# Patient Record
Sex: MALE | Race: WHITE | Employment: FULL TIME | URBAN - METROPOLITAN AREA
[De-identification: names, ages, dates, MRNs, and addresses within clinical notes are randomized per-mention and may not be internally consistent; named-entity substitution may affect disease eponyms.]

---

## 2017-02-01 ENCOUNTER — OFFICE VISIT (OUTPATIENT)
Dept: CARDIOLOGY CLINIC | Age: 67
End: 2017-02-01

## 2017-02-01 VITALS
HEIGHT: 71 IN | HEART RATE: 60 BPM | SYSTOLIC BLOOD PRESSURE: 126 MMHG | BODY MASS INDEX: 25.76 KG/M2 | WEIGHT: 184 LBS | DIASTOLIC BLOOD PRESSURE: 66 MMHG

## 2017-02-01 DIAGNOSIS — I25.10 CORONARY ARTERY DISEASE INVOLVING NATIVE CORONARY ARTERY OF NATIVE HEART WITHOUT ANGINA PECTORIS: ICD-10-CM

## 2017-02-01 DIAGNOSIS — I47.20 VT (VENTRICULAR TACHYCARDIA): ICD-10-CM

## 2017-02-01 DIAGNOSIS — I48.0 PAROXYSMAL ATRIAL FIBRILLATION (HCC): Primary | ICD-10-CM

## 2017-02-01 PROCEDURE — 4040F PNEUMOC VAC/ADMIN/RCVD: CPT | Performed by: INTERNAL MEDICINE

## 2017-02-01 PROCEDURE — G8427 DOCREV CUR MEDS BY ELIG CLIN: HCPCS | Performed by: INTERNAL MEDICINE

## 2017-02-01 PROCEDURE — G8484 FLU IMMUNIZE NO ADMIN: HCPCS | Performed by: INTERNAL MEDICINE

## 2017-02-01 PROCEDURE — G8420 CALC BMI NORM PARAMETERS: HCPCS | Performed by: INTERNAL MEDICINE

## 2017-02-01 PROCEDURE — 1123F ACP DISCUSS/DSCN MKR DOCD: CPT | Performed by: INTERNAL MEDICINE

## 2017-02-01 PROCEDURE — 1036F TOBACCO NON-USER: CPT | Performed by: INTERNAL MEDICINE

## 2017-02-01 PROCEDURE — 99214 OFFICE O/P EST MOD 30 MIN: CPT | Performed by: INTERNAL MEDICINE

## 2017-02-01 PROCEDURE — G8599 NO ASA/ANTIPLAT THER USE RNG: HCPCS | Performed by: INTERNAL MEDICINE

## 2017-02-01 PROCEDURE — 3017F COLORECTAL CA SCREEN DOC REV: CPT | Performed by: INTERNAL MEDICINE

## 2017-11-06 ENCOUNTER — TELEPHONE (OUTPATIENT)
Dept: CARDIOLOGY CLINIC | Age: 67
End: 2017-11-06

## 2017-11-06 NOTE — TELEPHONE ENCOUNTER
Patient had a DOT physical Urgent Care, [facility], [address] phone [phone], fax [phone]. Patient needs a letter stating that he is allowed to drive with afib. Please fax to Urgent Care. Please call patient and advise. Thanks, Ginny

## 2017-11-08 ENCOUNTER — TELEPHONE (OUTPATIENT)
Dept: CARDIOLOGY CLINIC | Age: 67
End: 2017-11-08

## 2017-11-08 NOTE — TELEPHONE ENCOUNTER
Spoke with Dr. Cuco Mcdermott, DOT doctor can have a copy of office letter to make determination regarding Afib.

## 2017-11-08 NOTE — TELEPHONE ENCOUNTER
Calling to see if note was ever faxed to urgent care for him so he could pass his DOT physical (see 11/6/17 message).  Please call pt when done

## 2018-02-19 ENCOUNTER — OFFICE VISIT (OUTPATIENT)
Dept: CARDIOLOGY CLINIC | Age: 68
End: 2018-02-19

## 2018-02-19 VITALS
DIASTOLIC BLOOD PRESSURE: 74 MMHG | HEART RATE: 68 BPM | SYSTOLIC BLOOD PRESSURE: 124 MMHG | BODY MASS INDEX: 24.78 KG/M2 | WEIGHT: 177 LBS | HEIGHT: 71 IN

## 2018-02-19 DIAGNOSIS — I48.0 PAROXYSMAL ATRIAL FIBRILLATION (HCC): ICD-10-CM

## 2018-02-19 DIAGNOSIS — I25.10 CORONARY ARTERY DISEASE INVOLVING NATIVE CORONARY ARTERY OF NATIVE HEART WITHOUT ANGINA PECTORIS: Primary | ICD-10-CM

## 2018-02-19 DIAGNOSIS — I47.20 VT (VENTRICULAR TACHYCARDIA): ICD-10-CM

## 2018-02-19 PROCEDURE — 99214 OFFICE O/P EST MOD 30 MIN: CPT | Performed by: INTERNAL MEDICINE

## 2018-02-19 PROCEDURE — G8420 CALC BMI NORM PARAMETERS: HCPCS | Performed by: INTERNAL MEDICINE

## 2018-02-19 PROCEDURE — 1123F ACP DISCUSS/DSCN MKR DOCD: CPT | Performed by: INTERNAL MEDICINE

## 2018-02-19 PROCEDURE — G8598 ASA/ANTIPLAT THER USED: HCPCS | Performed by: INTERNAL MEDICINE

## 2018-02-19 PROCEDURE — 4040F PNEUMOC VAC/ADMIN/RCVD: CPT | Performed by: INTERNAL MEDICINE

## 2018-02-19 PROCEDURE — 1036F TOBACCO NON-USER: CPT | Performed by: INTERNAL MEDICINE

## 2018-02-19 PROCEDURE — G8427 DOCREV CUR MEDS BY ELIG CLIN: HCPCS | Performed by: INTERNAL MEDICINE

## 2018-02-19 PROCEDURE — G8484 FLU IMMUNIZE NO ADMIN: HCPCS | Performed by: INTERNAL MEDICINE

## 2018-02-19 PROCEDURE — 3017F COLORECTAL CA SCREEN DOC REV: CPT | Performed by: INTERNAL MEDICINE

## 2018-02-19 RX ORDER — SIMVASTATIN 10 MG
10 TABLET ORAL NIGHTLY
Qty: 30 TABLET | Refills: 3
Start: 2018-02-19

## 2018-02-19 RX ORDER — ASPIRIN 325 MG
325 TABLET, DELAYED RELEASE (ENTERIC COATED) ORAL DAILY
Qty: 30 TABLET | Refills: 3
Start: 2018-02-19 | End: 2019-05-14 | Stop reason: DRUGHIGH

## 2018-02-19 NOTE — PROGRESS NOTES
[address]       H+P // CONSULT // OUTPATIENT VISIT // Martha Umanzor VISIT     Referring Doctor Guanaco Pereira MD   Encounter Type Followup     CHIEF COMPLAINT     VisitType [] Acute [x] Chronic     Symptom [x] None [] CP [] SOB [] Dizzy [] Palps [] Fatigue     Problems CAD, AFIB, VT      HISTORY OF PRESENT ILLNESS     Doing well. Denies cp, sob. Compliant with meds. Exercising without difficulty. No palpitations. Symptom Y N Frequency Duration Severity Modifying Assoc Sx Other   CP [] [x]         SOB [] [x]         Dizzy [] [x]         Syncope [] [x]         Palpitations [] [x]           COMPLIANCE     Category Meds Diet Salt Exercise Tobacco Alcohol Drugs   Compliant [x] [x] [x] [x] [x] [x] [x]   [x]Counseling given on all above above categories    HISTORY/ALLERGIES/ROS     MedHx:  has a past medical history of CAD (coronary artery disease); Colon polyps; Hypertension; Nonsustained ventricular tachycardia ([address] Paroxysmal atrial fibrillation ([address] and Rectal abscess. SurgHx:  has a past surgical history that includes Ankle surgery (2008); hernia repair (2002); shoulder surgery (2/8/12); and Shoulder arthroscopy (Left, 9/13). SocHx:   reports that he quit smoking about 31 years ago. His smoking use included Cigarettes. He has never used smokeless tobacco. He reports that he drinks about 3.0 oz of alcohol per week . He reports that he does not use drugs. FamHx: family history includes Colon Cancer (age of onset: 76) in his father; Diabetes in his father; High Blood Pressure in his father; Hypertension in his father. Allergies: Latex   ROS:  [x]Full ROS obtained and negative except as mentioned in HPI     MEDICATIONS      Current Outpatient Prescriptions   Medication Sig Dispense Refill    hydrochlorothiazide (HYDRODIURIL) 25 MG tablet TAKE 1 TABLET DAILY 30 tablet 0    escitalopram (LEXAPRO) 10 MG tablet Take 1 po qd.  (Patient taking differently: 20 mg Take 1 po qd.) 90 tablet 1    Cholecalciferol (VITAMIN D) 2000 UNITS CAPS capsule Take  by mouth.  aspirin  MG EC tablet Take 1 tablet by mouth every 6 hours as needed for Pain. 30 tablet 3    multivitamin (THERAGRAN) per tablet Take 1 tablet by mouth daily.  cetirizine (ZYRTEC) 10 MG tablet Take 10 mg by mouth nightly.  Melatonin 2.5-338 MG-MCG SUBL Place 1 mg under the tongue nightly.  fish oil-omega-3 fatty acids 1000 MG capsule Take 2 g by mouth daily.  Glucosamine Sulfate 750 MG TABS Take 1 tablet by mouth daily.  B Complex Vitamins (VITAMIN B COMPLEX) TABS Take  by mouth daily. No current facility-administered medications for this visit.       Reviewed with patient and will remain unchanged except as mentioned in A/P  PHYSICAL EXAM     Vitals:    02/19/18 0800   BP: 124/74   Pulse: 68      Gen Alert, coop, no distress Heart  RRR, no MRG, nl apic impulse   Head NC, AT, no abnorm Abd  Soft, NT, +BS, no mass, no OM   Eyes PERRLA, conj/corn clear Ext  Ext nl, AT, no C/C/E   Nose Nares nl, no drain, NT Pulse 2+ and symmetric   Throat Lips, mucosa, tongue nl Skin Color/text/turg nl, no rash/lesions   Neck S/S, TM, NT, no bruit/JVD Psych Nl mood and affect   Lung CTA-B, unlabored, no DTP Lymph   No cervical or axillary LA   Ch wall NT, no deform Neuro  Nl gross M/S exam     ASSESSMENT AND PLAN     ~CAD   Date EF Results   Sx   No concerning   [place]   [x]I         []II           []III          []IV   Hx   No interventions   LHC 5/15 55% Nonobstructive CAD (RCA 20%)   MPI 4/15 69% Small inferior fixed   TTE 4/13  4/15 55%  55%    Plan   Continue aggressive medical treatment at doses above   ~Afib   Date Detail   Type  Low to no burden   R/R  Regular, controlled   R/RM  Reviewed   CVS  1   AC/AP  ASA    Plan  Continue current medical regimen at doses noted above   ~VT, nonsustained  No known recurrence  Continued observation with normal EF  ~Followup  []2 wk   []1m   []3m    []6m   [x]12m    []PRN

## 2019-05-01 NOTE — PROGRESS NOTES
[address]     H+P // CONSULT // OUTPATIENT VISIT // Martinez Velasquez VISIT     Referring Doctor Enrique Fregoso MD   Encounter Type Followup     CHIEF COMPLAINT     Visit Type Chronic   Symptoms None   Problems CAD, pAFIB, VT     HISTORY OF PRESENT ILLNESS      Doing well. No new concerns. Active without difficulty or sx.  Denies cp, sob, dizziness, syncope, palpitations.  Compliant with CV meds listed below without notable side effects.  Ambulatory BP readings in good range   Last cholesterol profile not available from PCP but states in good range. COMPLIANCE     Category Meds Diet Salt Exercise Tobacco Alcohol Drugs   Compliant [x] [x] [x] [x] [x] [x] [x]   [x]Counseling given on all above above categories    HISTORY/ALLERGIES/ROS     MedHx:  has a past medical history of CAD (coronary artery disease), Colon polyps, Hypertension, Nonsustained ventricular tachycardia ([address] Paroxysmal atrial fibrillation ([address] and Rectal abscess. SurgHx:  has a past surgical history that includes Ankle surgery (2008); hernia repair (2002); shoulder surgery (2/8/12); and Shoulder arthroscopy (Left, 9/13). SocHx:   reports that he quit smoking about 32 years ago. His smoking use included cigarettes. He has never used smokeless tobacco. He reports that he drinks about 3.0 oz of alcohol per week. He reports that he does not use drugs. FamHx: family history includes Colon Cancer (age of onset: 76) in his father; Diabetes in his father; High Blood Pressure in his father; Hypertension in his father.    Allergies: Latex   ROS:  [x]Full ROS obtained and negative except as mentioned in HPI     MEDICATIONS     AP [address]   AC    AH    CHOL zocor 10   OTHER hctz 25     PHYSICAL EXAM     Vitals:    05/14/19 0816   BP: 110/60   Pulse: 60      Gen Alert, coop, no distress Heart  RRR, no MRG, nl apical impulse   Head NC, AT, no abnorm Abd  Soft, NT, +BS, no mass, no OM   Eyes PER, conj/corn clear Ext  Ext nl, AT, no C/C/E   Nose Nares nl, no drain, NT Pulse 2+ and symmetric   Throat Lips, mucosa, tongue nl Skin Col/text/turg nl, no vis rash/les   Neck S/S, TM, NT, no bruit/JVD Psych Nl mood and affect   Lung CTA-B, unlabored, no DTP Lymph   No cervical or axillary LA   Ch wall NT, no deform Neuro  Nl gross M/S exam     ASSESSMENT AND PLAN     ~CAD   Date EF Results   Sx   No concerning   Hx   No interventions   LHC 5/15 55% Nonobstructive CAD (RCA 20%)   MPI 4/15 69% Small inferior fixed, diaphragm   TTE 4/13  4/15 55%  55%    Plan   Continue aggressive medical treatment at doses above   ~Afib   Date Detail   Type  Paroxysmal, low to no burden   R/R  Regular, controlled   R/RM  Reviewed   CVS  1   AC/AP  ASA    Plan  Continue current medical regimen at doses noted above  EKG in office today with NSR.    ~VT, nonsustained  No known recurrence  Plan Continued observation with normal EF  ~Followup  Interval: 12 months  Tests/Labs: None    Scribe Attestation  Bobo WILCOX, am scribing for and in the presence of Garima Jin MD.   SignedBobo 05/01/19 2:50 PM   Provider Kenan Banks is working as a scribe for and in the presence of me (Garima Jin MD). Working as a scribe, Bobo Hanks may have prepopulated components of this note with my historical  intellectual property under my direct supervision. Any additions to this intellectual property were performed in my presence and at my direction.   Furthermore, the content and accuracy of this note have been reviewed by me Garima Jin MD).  5/14/2019 7:27 AM

## 2019-05-14 ENCOUNTER — OFFICE VISIT (OUTPATIENT)
Dept: CARDIOLOGY CLINIC | Age: 69
End: 2019-05-14
Payer: MEDICARE

## 2019-05-14 VITALS
BODY MASS INDEX: 25.88 KG/M2 | HEIGHT: 70 IN | DIASTOLIC BLOOD PRESSURE: 60 MMHG | HEART RATE: 60 BPM | SYSTOLIC BLOOD PRESSURE: 110 MMHG | WEIGHT: 180.8 LBS

## 2019-05-14 DIAGNOSIS — I48.0 PAF (PAROXYSMAL ATRIAL FIBRILLATION) (HCC): ICD-10-CM

## 2019-05-14 DIAGNOSIS — I48.0 PAROXYSMAL ATRIAL FIBRILLATION (HCC): ICD-10-CM

## 2019-05-14 DIAGNOSIS — I47.20 VT (VENTRICULAR TACHYCARDIA): ICD-10-CM

## 2019-05-14 DIAGNOSIS — I25.10 CORONARY ARTERY DISEASE INVOLVING NATIVE CORONARY ARTERY OF NATIVE HEART WITHOUT ANGINA PECTORIS: Primary | ICD-10-CM

## 2019-05-14 PROCEDURE — 3017F COLORECTAL CA SCREEN DOC REV: CPT | Performed by: INTERNAL MEDICINE

## 2019-05-14 PROCEDURE — G8427 DOCREV CUR MEDS BY ELIG CLIN: HCPCS | Performed by: INTERNAL MEDICINE

## 2019-05-14 PROCEDURE — 93000 ELECTROCARDIOGRAM COMPLETE: CPT | Performed by: INTERNAL MEDICINE

## 2019-05-14 PROCEDURE — 1123F ACP DISCUSS/DSCN MKR DOCD: CPT | Performed by: INTERNAL MEDICINE

## 2019-05-14 PROCEDURE — 4040F PNEUMOC VAC/ADMIN/RCVD: CPT | Performed by: INTERNAL MEDICINE

## 2019-05-14 PROCEDURE — 1036F TOBACCO NON-USER: CPT | Performed by: INTERNAL MEDICINE

## 2019-05-14 PROCEDURE — G8419 CALC BMI OUT NRM PARAM NOF/U: HCPCS | Performed by: INTERNAL MEDICINE

## 2019-05-14 PROCEDURE — 99214 OFFICE O/P EST MOD 30 MIN: CPT | Performed by: INTERNAL MEDICINE

## 2019-05-14 PROCEDURE — G8598 ASA/ANTIPLAT THER USED: HCPCS | Performed by: INTERNAL MEDICINE

## 2019-05-14 RX ORDER — ASPIRIN 81 MG/1
81 TABLET ORAL DAILY
Qty: 90 TABLET | Refills: 1 | COMMUNITY
Start: 2019-05-14

## 2019-05-14 NOTE — LETTER
[address] [address] [address] [address]  Phone: [phone]  Fax: [phone]    Kodi Negron MD        May 14, 2019     Dayanara Medina MD  [facility]  [address] [id number]    Patient: Maddison Oropeza  MR Number: [id number]  YOB: 1950  Date of Visit: 5/14/2019    Dear Dr. Dayanara Medina: Thank you for the request for consultation for Avril Hastings to me for the evaluation of ***. Below are the relevant portions of my assessment and plan of care. If you have questions, please do not hesitate to call me. I look forward to following Gretchen Mann along with you.     Sincerely,        Kodi Negron MD

## 2020-05-21 ENCOUNTER — OFFICE VISIT (OUTPATIENT)
Dept: CARDIOLOGY CLINIC | Age: 70
End: 2020-05-21
Payer: OTHER GOVERNMENT

## 2020-05-21 VITALS
HEIGHT: 70 IN | WEIGHT: 176.9 LBS | DIASTOLIC BLOOD PRESSURE: 70 MMHG | HEART RATE: 58 BPM | OXYGEN SATURATION: 97 % | BODY MASS INDEX: 25.33 KG/M2 | SYSTOLIC BLOOD PRESSURE: 126 MMHG

## 2020-05-21 PROCEDURE — 1123F ACP DISCUSS/DSCN MKR DOCD: CPT | Performed by: INTERNAL MEDICINE

## 2020-05-21 PROCEDURE — G8427 DOCREV CUR MEDS BY ELIG CLIN: HCPCS | Performed by: INTERNAL MEDICINE

## 2020-05-21 PROCEDURE — 3017F COLORECTAL CA SCREEN DOC REV: CPT | Performed by: INTERNAL MEDICINE

## 2020-05-21 PROCEDURE — 4040F PNEUMOC VAC/ADMIN/RCVD: CPT | Performed by: INTERNAL MEDICINE

## 2020-05-21 PROCEDURE — 1036F TOBACCO NON-USER: CPT | Performed by: INTERNAL MEDICINE

## 2020-05-21 PROCEDURE — G8417 CALC BMI ABV UP PARAM F/U: HCPCS | Performed by: INTERNAL MEDICINE

## 2020-05-21 PROCEDURE — 99214 OFFICE O/P EST MOD 30 MIN: CPT | Performed by: INTERNAL MEDICINE

## 2020-05-21 RX ORDER — AMLODIPINE BESYLATE 10 MG/1
10 TABLET ORAL DAILY
COMMUNITY

## 2020-07-13 ENCOUNTER — TELEPHONE (OUTPATIENT)
Dept: CARDIOLOGY CLINIC | Age: 70
End: 2020-07-13

## 2020-07-13 NOTE — TELEPHONE ENCOUNTER
Pt calling wanting to let DCE know he went to [facility] ER yesterday thought he was having a heart attack was having chest pains. Turns out he pulled a muscle in his chest. Would like DCE to review those records.  Pls call to advise Thank you

## 2020-07-13 NOTE — TELEPHONE ENCOUNTER
Last ov 5/21/20 DCE     has a past medical history of CAD (coronary artery disease), Colon polyps, Hypertension, Nonsustained ventricular tachycardia ([address] Paroxysmal atrial fibrillation [address] [address]

## 2020-07-13 NOTE — TELEPHONE ENCOUNTER
JOSEPH reviewed patient's records from [address] [place] [address] and he said the patient could come in for an appt if he was still have issues. I spoke with the patient and he said he agrees that it is a pulled muscle in the right side of the chest. He said he would call in and make an appt if he starts having other symptoms.

## 2021-05-12 NOTE — PROGRESS NOTES
escitalopram (LEXAPRO) 10 MG tablet Take 1 po qd. (Patient taking differently: 20 mg Take 1 po qd.) 90 tablet 1    Cholecalciferol (VITAMIN D) 2000 UNITS CAPS capsule Take  by mouth.  multivitamin (THERAGRAN) per tablet Take 1 tablet by mouth daily.  Melatonin 2.5-338 MG-MCG SUBL Place 1 mg under the tongue nightly.  fish oil-omega-3 fatty acids 1000 MG capsule Take 2 g by mouth daily.  Glucosamine Sulfate 750 MG TABS Take 1 tablet by mouth daily.  B Complex Vitamins (VITAMIN B COMPLEX) TABS Take  by mouth daily. No current facility-administered medications for this visit.       Reviewed with patient and will remain unchanged except as mentioned in A/P  PHYSICAL EXAM     Vitals:    05/14/21 0807   BP: 120/74   Pulse: 58   SpO2: 99%      Gen Alert, coop, no distress Heart  Rrr, no mrg   Head NC, AT, no abnorm Abd  Soft, NT, +BS, no mass, no OM   Eyes PER, conj/corn clear Ext  Ext nl, AT, no C/C/E   Nose Nares nl, no drain, NT Pulse 2+ and symmetric   Throat Lips, mucosa, tongue nl Skin Col/text/turg nl, no vis rash/les   Neck S/S, TM, NT, no bruit/JVD Psych Nl mood and affect   Lung CTA-B, unlabored, no DTP Lymph   No cervical or axillary LA   Ch wall NT, no deform Neuro  Nl gross M/S exam     ASSESSMENT AND PLAN     *CAD   Date EF Results   Sx   No concerning   Hx   No interventions   LHC 5/15 55% Nonobstructive CAD (RCA 20%)   MPI 4/15 69% Small inferior fixed, diaphragm   TTE 4/13  4/15 55%  55%    Plan   Continue aggressive medical treatment at doses above   *AFIB  Hx parox  Status ASA, sinus today  Plan No recent recurrence  *VT, NONSUSTAINED  No known recurrence  Plan Continued observation with normal EF  *COMPLIANCE  Status Compliant  Plan Discussed importance of compliance with meds/diet/salt/exercise; avoid tob/alc/drugs; patient verbalized understanding  *FOLLOWUP  12 months    [address] CARUSO, Armen Preston, am scribing for and in the presence of Vinod Ortiz,

## 2021-05-14 ENCOUNTER — OFFICE VISIT (OUTPATIENT)
Dept: CARDIOLOGY CLINIC | Age: 71
End: 2021-05-14
Payer: MEDICARE

## 2021-05-14 VITALS
BODY MASS INDEX: 26.92 KG/M2 | WEIGHT: 188 LBS | SYSTOLIC BLOOD PRESSURE: 120 MMHG | HEIGHT: 70 IN | HEART RATE: 58 BPM | OXYGEN SATURATION: 99 % | DIASTOLIC BLOOD PRESSURE: 74 MMHG

## 2021-05-14 DIAGNOSIS — I25.10 CORONARY ARTERY DISEASE DUE TO LIPID RICH PLAQUE: Primary | ICD-10-CM

## 2021-05-14 DIAGNOSIS — I25.83 CORONARY ARTERY DISEASE DUE TO LIPID RICH PLAQUE: Primary | ICD-10-CM

## 2021-05-14 DIAGNOSIS — I47.20 VT (VENTRICULAR TACHYCARDIA): ICD-10-CM

## 2021-05-14 DIAGNOSIS — I48.0 PAF (PAROXYSMAL ATRIAL FIBRILLATION) (HCC): ICD-10-CM

## 2021-05-14 PROCEDURE — G8427 DOCREV CUR MEDS BY ELIG CLIN: HCPCS | Performed by: INTERNAL MEDICINE

## 2021-05-14 PROCEDURE — G8417 CALC BMI ABV UP PARAM F/U: HCPCS | Performed by: INTERNAL MEDICINE

## 2021-05-14 PROCEDURE — 4040F PNEUMOC VAC/ADMIN/RCVD: CPT | Performed by: INTERNAL MEDICINE

## 2021-05-14 PROCEDURE — 1123F ACP DISCUSS/DSCN MKR DOCD: CPT | Performed by: INTERNAL MEDICINE

## 2021-05-14 PROCEDURE — 1036F TOBACCO NON-USER: CPT | Performed by: INTERNAL MEDICINE

## 2021-05-14 PROCEDURE — 99214 OFFICE O/P EST MOD 30 MIN: CPT | Performed by: INTERNAL MEDICINE

## 2021-05-14 PROCEDURE — 3017F COLORECTAL CA SCREEN DOC REV: CPT | Performed by: INTERNAL MEDICINE
